# Patient Record
Sex: FEMALE
[De-identification: names, ages, dates, MRNs, and addresses within clinical notes are randomized per-mention and may not be internally consistent; named-entity substitution may affect disease eponyms.]

---

## 2024-06-18 ENCOUNTER — APPOINTMENT (OUTPATIENT)
Dept: ORTHOPEDIC SURGERY | Facility: CLINIC | Age: 54
End: 2024-06-18
Payer: COMMERCIAL

## 2024-06-18 VITALS — HEIGHT: 59 IN | BODY MASS INDEX: 25.2 KG/M2 | WEIGHT: 125 LBS

## 2024-06-18 DIAGNOSIS — Z78.9 OTHER SPECIFIED HEALTH STATUS: ICD-10-CM

## 2024-06-18 DIAGNOSIS — M17.0 BILATERAL PRIMARY OSTEOARTHRITIS OF KNEE: ICD-10-CM

## 2024-06-18 PROBLEM — Z00.00 ENCOUNTER FOR PREVENTIVE HEALTH EXAMINATION: Status: ACTIVE | Noted: 2024-06-18

## 2024-06-18 PROCEDURE — J3490M: CUSTOM

## 2024-06-18 PROCEDURE — 73562 X-RAY EXAM OF KNEE 3: CPT | Mod: 50

## 2024-06-18 PROCEDURE — 99203 OFFICE O/P NEW LOW 30 MIN: CPT | Mod: 25

## 2024-06-18 PROCEDURE — 20610 DRAIN/INJ JOINT/BURSA W/O US: CPT | Mod: 50

## 2024-06-18 NOTE — PHYSICAL EXAM
[de-identified] : Constitutional: Well developed, well nourished, able to communicate Neuro: Normal sensation, No focal deficits Skin: Intact CV: Peripheral vascular exam grossly normal Pulm: No signs of respiratory distress Psych: Oriented, normal mood and affect

## 2024-06-18 NOTE — PROCEDURE
[FreeTextEntry3] : A pre-procedure verification was performed by asking the patient to state their name, , and the location of the procedure being performed in their own words.  A review of allergies was accomplished through dialog and the patient, ending thus in the full agreement. The risks and benefits were explained and consent were obtained verbally.  Procedure: Intraarticular Injection >>  Knee, Bilateral Consent was obtained. Patients questions were answered to the best of my ability prior to procedure. Injection site and surrounding bony landmarks were palpated and marked prior to proceeding. Site was cleaned and prepped in the typical fashion using alcohol swabs. Cold spray used on skin for patient comfort. Needle was advanced into the intraarticular space from the inferolateral approach. A combination of 3cc Marcaine w/o Epi and 80mg Depomedrol was then injected into the joint. Patient tolerated the procedure well, without significant complications. Minimal (<3cc) blood loss experienced.

## 2024-06-18 NOTE — HISTORY OF PRESENT ILLNESS
[7] : 7 [9] : 9 [Burning] : burning [Localized] : localized [Intermittent] : intermittent [Meds] : meds [Massage] : massage [Standing] : standing [Bending forward] : bending forward [Stairs] : stairs [de-identified] : 06/18/2024: Patient is a 54 year y.o. female presenting for evaluation of bilateral knee pain. Onset: 2-3 months ago. Pt denies any prior injury and states that she has been experiencing intermittent knee pain for the past few months and it did worsen over time. She notes a "pinching" pain at rest, and with activities such as bending, kneeling, and taking the stairs. R>L Pain, pain is localized to her knees. She has been taking Aleve as needed for relief, along with massaging with Icy Hot, pt has also been using a compression sleeve, these things have helped with relief. No weakness, no numbness/tingling, no swelling. Non-smoker, no blood thinners, non-diabetic.    [] : no [FreeTextEntry1] : bilateral knee  [FreeTextEntry6] : pinching

## 2024-06-18 NOTE — ASSESSMENT
[FreeTextEntry1] :  -Discussed the pathophysiology of knee OA along with treatments including survellience/monitoring, use of NSAIDs/Tylenol/ice, use of compression sleeve, HEP, PT, injection therapy, procedural and surgical therapy - Bilateral knee CSI given tolerated well - Discussed use of tylenol and ice as needed for injection site relief - HEP given - Follow up in 6 weeks if needed. Call if HA injection auth wanted

## 2024-06-18 NOTE — IMAGING
[de-identified] : L knee: - No obvious deformity or swelling - No pain with palpation of lateral joint line. - Medical joint line pain - ROM from 135 degrees of flexion to 0 degrees extension. - 5/5 strength with knee flexion and extension - Stable varus, valgus testing - Distally neurovascularly intact.    R knee: - No obvious deformity or swelling - No pain with palpation of lateral joint line. - Medical joint line pain - ROM from 135 degrees of flexion to 0 degrees extension. - 5/5 strength with knee flexion and extension - Stable varus, valgus testing - Distally neurovascularly intact.    [Bilateral] : knee bilaterally [There are no fractures, subluxations or dislocations. No significant abnormalities are seen] : There are no fractures, subluxations or dislocations. No significant abnormalities are seen [Degenerative change] : Degenerative change [Moderate tricompartmental OA medial narrowing] : Moderate tricompartmental OA medial narrowing [Moderate tricompartmental OA lateral narrowing] : Moderate tricompartmental OA lateral narrowing [Moderate patellofemoral OA] : Moderate patellofemoral OA

## 2024-08-20 ENCOUNTER — APPOINTMENT (OUTPATIENT)
Dept: ORTHOPEDIC SURGERY | Facility: CLINIC | Age: 54
End: 2024-08-20

## 2024-10-16 ENCOUNTER — APPOINTMENT (OUTPATIENT)
Dept: ORTHOPEDIC SURGERY | Facility: CLINIC | Age: 54
End: 2024-10-16
Payer: COMMERCIAL

## 2024-10-16 DIAGNOSIS — M17.0 BILATERAL PRIMARY OSTEOARTHRITIS OF KNEE: ICD-10-CM

## 2024-10-16 PROCEDURE — J3490M: CUSTOM

## 2024-10-16 PROCEDURE — 20611 DRAIN/INJ JOINT/BURSA W/US: CPT | Mod: RT

## 2024-10-16 PROCEDURE — 99213 OFFICE O/P EST LOW 20 MIN: CPT | Mod: 25
